# Patient Record
(demographics unavailable — no encounter records)

---

## 2025-04-07 NOTE — DISCUSSION/SUMMARY
[Reviewed Clinical Lab Test(s)] : Results of clinical tests were reviewed. [Reviewed Radiology Report(s)] : Radiology reports were reviewed. [Discuss Tests w/Referring Providers] : Results of labs/radiology studies and the treatment recommendations were discussed with performing/referring physician. [Discuss Alternatives/Risks/Benefits w/Patient] : All alternatives, risks, and benefits were discussed with the patient/family and all questions were answered.  Patient expressed good understanding and appreciates the importance of follow up as recommended. [Pre Op] : The differential diagnosis was discussed in detail. The indications, risks, benefits and alternatives were discussed. [unfilled] expressed an understanding of the treatment rationale and her questions were answered to her apparent satisfaction. [FreeTextEntry2] : Pelvic Mass; possible adnexal torsion [FreeTextEntry1] : 35 with 11.5 cm right ovarian mass, suspected dermoid on TVUS, presenting for surgical planning I discussed with the patient (and her mother) with the aid of diagrams, reviewed the findings on history and physical examination, and reviewed the imaging studies in detail.     We discussed the differential diagnosis which includes benign, such as mature teratoma, low malignant potential tumors, and malignancy.    In the case of asymptomatic cysts, without findings concerning for malignancy (such as solid components, increasing size, vascular septations, and elevated tumor markers) conservative management is an option.     In the case of symptomatic or large cysts, or those with findings concerning for malignancy, the recommendation is for surgical removal. Again, with the aid of diagrams, different surgical approaches were discussed including minimally invasive and open approaches.   Laparoscopic cystectomy vs. unilateral salpingoophorectomy discussed. Increased risk of cyst rupture with ovarian cystectomy explained. In case of mature teratoma, rupture of the cyst can cause chemical peritonitis and massive abdominal irrigation would be indicated. In cases of borderline or malignancy, cyst rupture will result in upstaging and in some cases the need for chemotherapy that would not be necessary if cyst rupture was avoided. Cyst rupture can increase the risk of recurrence in both borderline and malignant tumors. If findings grossly concerning for malignancy a frozen section will be performed; and the appropriate additional management including but not limited to pelvic and paraaortic lymph node dissection, omentectomy and appendectomy. It was explained that in the case of a high-grade malignancy removal of the uterus and other ovary is recommended. Fertility sparing management can be considered, with the understanding that additional surgery in the future will likely be necessary.  NCCN guidelines discussed.  Possible laparotomy also discussed. If diagnosis is not clear on frozen section, we will avoid any additional procedures and defer to permanent pathology. This may require additional surgery and the patient states understanding this.   Complications that include, but are not limited to: bleeding, infection, injury to other organs including bowel, bladder, ureters, blood vessels, nerves; infections, blood clots, lymphedema, pneumonia, wound complications and prolonged hospital stay have all been discussed with the patient. Whenever minimally invasive surgery is attempted, there is a chance of needing to convert to laparotomy. The risk of occult injury requiring additional surgery also discussed. I have also provided her with the diagrams.      Surgical scheduling was discussed and instructions for optimization prior to surgery were given. will follow the Enhanced Recovery After Surgery (ERAS) protocol.    No aspirin or NSAID products for 1 week prior.   She will choose a surgical date.   - Tumor markers and preOP labs - MRI  - Will need medical clearance - R/a Right ovarian cystectomy if feasible. In case that there is no normal ovarian tissue, might have right salpingo-oophprectomy.

## 2025-04-07 NOTE — PHYSICAL EXAM
[Normal] : Parametria: Normal [Abnormal] : Recto-Vaginal Exam: Abnormal [Fully active, able to carry on all pre-disease performance without restriction] : Status 0 - Fully active, able to carry on all pre-disease performance without restriction [de-identified] : Right fullness [de-identified] : Right adnexal fullness [de-identified] : Smooth right ovarian mass

## 2025-04-07 NOTE — HISTORY OF PRESENT ILLNESS
[FreeTextEntry1] : Problem:  1. Right ovarian mass 11.5 cm  Prior tests:  1. TVUS 4/3/2025:  Large heterogeneous lesion inthe right adnexa, measuring 11.5 cm, possibly dermoid. Some low resistance waveforms are identified in the  periphery of the lesion. Normal right ovarian parenchyma is not identified. Ovarian torsion should be excluded on a clinical basis.  36 yo referred by Dr. Troy Wilson.  She hasn't been seen by GYN since 2019 bc her doctor retired, and now she presented to ED 5 days ago with abdominal pain and found to have the above findings on imaging. Pain has subsided and she is very anxious and wants to have definitive surgery.  OB/GYN Hx: G0, history of ovarian cyst last saw gyn in 2019 PMHx: denies SHx: rhinoplasty after a fall Meds: denies Allergies: NKDA  PAP 9/2023 wnl

## 2025-04-17 NOTE — HISTORY OF PRESENT ILLNESS
[FreeTextEntry1] : Problem:  1. Right ovarian mass 11.5 cm  Prior tests:  1. TVUS 4/3/2025:  Large heterogeneous lesion inthe right adnexa, measuring 11.5 cm, possibly dermoid. Some low resistance waveforms are identified in the  periphery of the lesion. Normal right ovarian parenchyma is not identified. Ovarian torsion should be excluded on a clinical basis. 2) Pelvic MRI 4/9/25    a) 10.3 cm right ovarian dermoid cyst. 3) S/P RA-Ovarian Cystectomy 4/10/25    a)   35yo s/p RA-Ovarian Cystectomy here for 1 week post-op visit. Pain controlled with Ibuprofen prn. + Bms.    OB/GYN Hx: G0, history of ovarian cyst last saw gyn in 2019 PMHx: denies SHx: rhinoplasty after a fall Meds: denies Allergies: NKDA  PAP 9/2023 wnl

## 2025-04-17 NOTE — DISCUSSION/SUMMARY
[FreeTextEntry1] : s/p RA-Ovarian Cystectomy Meeting post-op milestones  [] post-op precautions given [] f/u pathology [] f/u with Dr. Matute in 3 weeks

## 2025-05-01 NOTE — REASON FOR VISIT
[Home] : at home, [unfilled] , at the time of the visit. [Medical Office: ([facility])___] : at the medical office located in  [Telehealth (audio & video)] : This visit was provided via telehealth using real-time 2-way audio visual technology. [Verbal consent obtained from patient] : the patient, [unfilled] [FreeTextEntry1] : Pathology Results

## 2025-05-01 NOTE — DISCUSSION/SUMMARY
[FreeTextEntry1] : 37 yo s/p RA-ovarian cystectomy here for pathology results discussion.  [] f/u for 1 month post-op

## 2025-05-01 NOTE — HISTORY OF PRESENT ILLNESS
[FreeTextEntry1] : Problem:  1. Right ovarian mass 11.5 cm  Prior tests:  1. TVUS 4/3/2025:  Large heterogeneous lesion inthe right adnexa, measuring 11.5 cm, possibly dermoid. Some low resistance waveforms are identified in the  periphery of the lesion. Normal right ovarian parenchyma is not identified. Ovarian torsion should be excluded on a clinical basis. 2) Pelvic MRI 4/9/25    a) 10.3 cm right ovarian dermoid cyst. 3) S/P RA-Ovarian Cystectomy 4/10/25    a) Immature Teratoma, Grade 1 (low grade)   35yo s/p RA-Ovarian Cystectomy here for pathology result discussion.    OB/GYN Hx: G0, history of ovarian cyst last saw gyn in 2019 PMHx: denies SHx: rhinoplasty after a fall Meds: denies Allergies: NKDA  PAP 9/2023 wnl

## 2025-05-14 NOTE — HISTORY OF PRESENT ILLNESS
[FreeTextEntry1] : Problem:  1. Right ovarian mass 11.5 cm  Prior tests:  1. TVUS 4/3/2025:  Large heterogeneous lesion inthe right adnexa, measuring 11.5 cm, possibly dermoid. Some low resistance waveforms are identified in the  periphery of the lesion. Normal right ovarian parenchyma is not identified. Ovarian torsion should be excluded on a clinical basis. 2) Pelvic MRI 4/9/25    a) 10.3 cm right ovarian dermoid cyst. 3) S/P RA-Ovarian Cystectomy 4/10/25    a) Immature Teratoma, Grade 1 (low grade)   35yo s/p RA-Ovarian Cystectomy here for 1 month post op visit and discussion of tumor board recommendations.  Tumor board recommended observation at this time. Patient feels well today, no pain or bleeding.    OB/GYN Hx: G0, history of ovarian cyst last saw gyn in 2019 PMHx: denies SHx: rhinoplasty after a fall Meds: denies Allergies: NKDA  PAP 9/2023 wnl

## 2025-05-14 NOTE — DISCUSSION/SUMMARY
[Discuss Alternatives/Risks/Benefits w/Patient] : All alternatives, risks, and benefits were discussed with the patient/family and all questions were answered.  Patient expressed good understanding and appreciates the importance of follow up as recommended. [FreeTextEntry1] : 35 yo s/p RA-ovarian cystectomy. Pt is cleared for regular activity, no restrictions. Discussed future fertility. Pt is interested in being connected with [facility].   [] TVUS 3 months at [facility] [] f/u in 1 year if TVUS is normal   [] silicone strips for incisional scars

## 2025-05-14 NOTE — HISTORY OF PRESENT ILLNESS
[FreeTextEntry1] : Problem:  1. Right ovarian mass 11.5 cm  Prior tests:  1. TVUS 4/3/2025:  Large heterogeneous lesion inthe right adnexa, measuring 11.5 cm, possibly dermoid. Some low resistance waveforms are identified in the  periphery of the lesion. Normal right ovarian parenchyma is not identified. Ovarian torsion should be excluded on a clinical basis. 2) Pelvic MRI 4/9/25    a) 10.3 cm right ovarian dermoid cyst. 3) S/P RA-Ovarian Cystectomy 4/10/25    a) Immature Teratoma, Grade 1 (low grade)   37yo s/p RA-Ovarian Cystectomy here for 1 month post op visit and discussion of tumor board recommendations.  Tumor board recommended observation at this time. Patient feels well today, no pain or bleeding.    OB/GYN Hx: G0, history of ovarian cyst last saw gyn in 2019 PMHx: denies SHx: rhinoplasty after a fall Meds: denies Allergies: NKDA  PAP 9/2023 wnl

## 2025-05-14 NOTE — DISCUSSION/SUMMARY
[Discuss Alternatives/Risks/Benefits w/Patient] : All alternatives, risks, and benefits were discussed with the patient/family and all questions were answered.  Patient expressed good understanding and appreciates the importance of follow up as recommended. [FreeTextEntry1] : 37 yo s/p RA-ovarian cystectomy. Pt is cleared for regular activity, no restrictions. Discussed future fertility. Pt is interested in being connected with [facility].   [] TVUS 3 months at [facility] [] f/u in 1 year if TVUS is normal   [] silicone strips for incisional scars

## 2025-05-14 NOTE — PHYSICAL EXAM
[Normal] : Recto-Vaginal Exam: Normal [de-identified] : incision sites healing well [Fully active, able to carry on all pre-disease performance without restriction] : Status 0 - Fully active, able to carry on all pre-disease performance without restriction

## 2025-05-14 NOTE — PHYSICAL EXAM
[Normal] : Recto-Vaginal Exam: Normal [de-identified] : incision sites healing well [Fully active, able to carry on all pre-disease performance without restriction] : Status 0 - Fully active, able to carry on all pre-disease performance without restriction